# Patient Record
Sex: MALE | Race: WHITE | URBAN - METROPOLITAN AREA
[De-identification: names, ages, dates, MRNs, and addresses within clinical notes are randomized per-mention and may not be internally consistent; named-entity substitution may affect disease eponyms.]

---

## 2017-07-25 ENCOUNTER — ANESTHESIA EVENT (OUTPATIENT)
Dept: SURGERY | Facility: CLINIC | Age: 72
End: 2017-07-25
Payer: MEDICARE

## 2017-07-31 RX ORDER — ATENOLOL 50 MG/1
50 TABLET ORAL DAILY
COMMUNITY

## 2017-07-31 RX ORDER — LISINOPRIL AND HYDROCHLOROTHIAZIDE 12.5; 2 MG/1; MG/1
2 TABLET ORAL EVERY OTHER DAY
COMMUNITY

## 2017-07-31 RX ORDER — CALCIUM CARBONATE 500(1250)
1 TABLET ORAL DAILY
COMMUNITY

## 2017-08-01 ENCOUNTER — HOSPITAL ENCOUNTER (OUTPATIENT)
Dept: GENERAL RADIOLOGY | Facility: CLINIC | Age: 72
Discharge: HOME | End: 2017-08-01
Attending: PHYSICAL MEDICINE & REHABILITATION | Admitting: PHYSICAL MEDICINE & REHABILITATION
Payer: MEDICARE

## 2017-08-01 ENCOUNTER — HOSPITAL ENCOUNTER (OUTPATIENT)
Facility: CLINIC | Age: 72
Setting detail: OBSERVATION
Discharge: HOME | End: 2017-08-01
Attending: PHYSICAL MEDICINE & REHABILITATION | Admitting: PHYSICAL MEDICINE & REHABILITATION
Payer: MEDICARE

## 2017-08-01 ENCOUNTER — ANESTHESIA (OUTPATIENT)
Dept: SURGERY | Facility: CLINIC | Age: 72
End: 2017-08-01
Payer: MEDICARE

## 2017-08-01 ENCOUNTER — SURGERY (OUTPATIENT)
Age: 72
End: 2017-08-01

## 2017-08-01 VITALS
SYSTOLIC BLOOD PRESSURE: 146 MMHG | RESPIRATION RATE: 16 BRPM | BODY MASS INDEX: 26 KG/M2 | OXYGEN SATURATION: 99 % | DIASTOLIC BLOOD PRESSURE: 80 MMHG | WEIGHT: 175.56 LBS | TEMPERATURE: 98.9 F | HEIGHT: 69 IN

## 2017-08-01 DIAGNOSIS — G89.29 CHRONIC LOW BACK PAIN: ICD-10-CM

## 2017-08-01 DIAGNOSIS — M54.50 CHRONIC LOW BACK PAIN: ICD-10-CM

## 2017-08-01 PROBLEM — G89.18 POSTOPERATIVE PAIN: Status: ACTIVE | Noted: 2017-08-01

## 2017-08-01 PROCEDURE — A9270 NON-COVERED ITEM OR SERVICE: HCPCS | Mod: GY | Performed by: PHYSICAL MEDICINE & REHABILITATION

## 2017-08-01 PROCEDURE — 37000008 ZZH ANESTHESIA TECHNICAL FEE, 1ST 30 MIN: Performed by: PHYSICAL MEDICINE & REHABILITATION

## 2017-08-01 PROCEDURE — 71000027 ZZH RECOVERY PHASE 2 EACH 15 MINS: Performed by: PHYSICAL MEDICINE & REHABILITATION

## 2017-08-01 PROCEDURE — 27211024 ZZHC OR SUPPLY OTHER OPNP: Performed by: PHYSICAL MEDICINE & REHABILITATION

## 2017-08-01 PROCEDURE — 37000009 ZZH ANESTHESIA TECHNICAL FEE, EACH ADDTL 15 MIN: Performed by: PHYSICAL MEDICINE & REHABILITATION

## 2017-08-01 PROCEDURE — 25000125 ZZHC RX 250: Performed by: PHYSICAL MEDICINE & REHABILITATION

## 2017-08-01 PROCEDURE — 40000170 ZZH STATISTIC PRE-PROCEDURE ASSESSMENT II: Performed by: PHYSICAL MEDICINE & REHABILITATION

## 2017-08-01 PROCEDURE — 27810169 ZZH OR IMPLANT GENERAL: Performed by: PHYSICAL MEDICINE & REHABILITATION

## 2017-08-01 PROCEDURE — 25000132 ZZH RX MED GY IP 250 OP 250 PS 637: Mod: GY | Performed by: PHYSICAL MEDICINE & REHABILITATION

## 2017-08-01 PROCEDURE — 27210995 ZZH RX 272: Performed by: PHYSICAL MEDICINE & REHABILITATION

## 2017-08-01 PROCEDURE — 25000132 ZZH RX MED GY IP 250 OP 250 PS 637: Mod: GY | Performed by: ANESTHESIOLOGY

## 2017-08-01 PROCEDURE — C1787 PATIENT PROGR, NEUROSTIM: HCPCS | Performed by: PHYSICAL MEDICINE & REHABILITATION

## 2017-08-01 PROCEDURE — 27210794 ZZH OR GENERAL SUPPLY STERILE: Performed by: PHYSICAL MEDICINE & REHABILITATION

## 2017-08-01 PROCEDURE — A9270 NON-COVERED ITEM OR SERVICE: HCPCS | Mod: GY | Performed by: ANESTHESIOLOGY

## 2017-08-01 PROCEDURE — 25000125 ZZHC RX 250: Performed by: ANESTHESIOLOGY

## 2017-08-01 PROCEDURE — 25000128 H RX IP 250 OP 636: Performed by: ANESTHESIOLOGY

## 2017-08-01 PROCEDURE — C1778 LEAD, NEUROSTIMULATOR: HCPCS | Performed by: PHYSICAL MEDICINE & REHABILITATION

## 2017-08-01 PROCEDURE — 25000128 H RX IP 250 OP 636: Performed by: NURSE ANESTHETIST, CERTIFIED REGISTERED

## 2017-08-01 PROCEDURE — 40000277 XR SURGERY CARM FLUORO LESS THAN 5 MIN W STILLS

## 2017-08-01 PROCEDURE — 36000093 ZZH SURGERY LEVEL 4 1ST 30 MIN: Performed by: PHYSICAL MEDICINE & REHABILITATION

## 2017-08-01 PROCEDURE — C1822 GEN, NEURO, HF, RECHG BAT: HCPCS | Performed by: PHYSICAL MEDICINE & REHABILITATION

## 2017-08-01 PROCEDURE — 71000013 ZZH RECOVERY PHASE 1 LEVEL 1 EA ADDTL HR: Performed by: PHYSICAL MEDICINE & REHABILITATION

## 2017-08-01 PROCEDURE — 25000125 ZZHC RX 250: Performed by: NURSE ANESTHETIST, CERTIFIED REGISTERED

## 2017-08-01 PROCEDURE — 71000012 ZZH RECOVERY PHASE 1 LEVEL 1 FIRST HR: Performed by: PHYSICAL MEDICINE & REHABILITATION

## 2017-08-01 PROCEDURE — 36000063 ZZH SURGERY LEVEL 4 EA 15 ADDTL MIN: Performed by: PHYSICAL MEDICINE & REHABILITATION

## 2017-08-01 PROCEDURE — C1713 ANCHOR/SCREW BN/BN,TIS/BN: HCPCS | Performed by: PHYSICAL MEDICINE & REHABILITATION

## 2017-08-01 DEVICE — IMP ENVELOPE MEDT TYRX ABS ANTIBACTERIAL LG NMRM6133: Type: IMPLANTABLE DEVICE | Site: BACK | Status: FUNCTIONAL

## 2017-08-01 RX ORDER — PROCHLORPERAZINE 25 MG
12.5 SUPPOSITORY, RECTAL RECTAL EVERY 12 HOURS PRN
Status: DISCONTINUED | OUTPATIENT
Start: 2017-08-01 | End: 2017-08-01 | Stop reason: HOSPADM

## 2017-08-01 RX ORDER — SODIUM CHLORIDE, SODIUM LACTATE, POTASSIUM CHLORIDE, CALCIUM CHLORIDE 600; 310; 30; 20 MG/100ML; MG/100ML; MG/100ML; MG/100ML
INJECTION, SOLUTION INTRAVENOUS CONTINUOUS
Status: DISCONTINUED | OUTPATIENT
Start: 2017-08-01 | End: 2017-08-01 | Stop reason: HOSPADM

## 2017-08-01 RX ORDER — ONDANSETRON 2 MG/ML
4 INJECTION INTRAMUSCULAR; INTRAVENOUS EVERY 6 HOURS PRN
Status: DISCONTINUED | OUTPATIENT
Start: 2017-08-01 | End: 2017-08-01 | Stop reason: HOSPADM

## 2017-08-01 RX ORDER — CEFAZOLIN SODIUM 2 G/100ML
2 INJECTION, SOLUTION INTRAVENOUS
Status: DISCONTINUED | OUTPATIENT
Start: 2017-08-01 | End: 2017-08-01 | Stop reason: HOSPADM

## 2017-08-01 RX ORDER — AMOXICILLIN 250 MG
1-2 CAPSULE ORAL 2 TIMES DAILY
Status: DISCONTINUED | OUTPATIENT
Start: 2017-08-01 | End: 2017-08-01 | Stop reason: HOSPADM

## 2017-08-01 RX ORDER — FENTANYL CITRATE 50 UG/ML
25-50 INJECTION, SOLUTION INTRAMUSCULAR; INTRAVENOUS
Status: DISCONTINUED | OUTPATIENT
Start: 2017-08-01 | End: 2017-08-01 | Stop reason: HOSPADM

## 2017-08-01 RX ORDER — HYDROMORPHONE HYDROCHLORIDE 2 MG/1
2 TABLET ORAL ONCE
Status: COMPLETED | OUTPATIENT
Start: 2017-08-01 | End: 2017-08-01

## 2017-08-01 RX ORDER — KETAMINE HCL IN 0.9 % NACL 20 MG/2 ML
15 SYRINGE (ML) INTRAVENOUS ONCE
Status: COMPLETED | OUTPATIENT
Start: 2017-08-01 | End: 2017-08-01

## 2017-08-01 RX ORDER — METOCLOPRAMIDE 5 MG/1
5 TABLET ORAL EVERY 6 HOURS PRN
Status: DISCONTINUED | OUTPATIENT
Start: 2017-08-01 | End: 2017-08-01 | Stop reason: HOSPADM

## 2017-08-01 RX ORDER — ACETAMINOPHEN 500 MG
1000 TABLET ORAL ONCE
Status: COMPLETED | OUTPATIENT
Start: 2017-08-01 | End: 2017-08-01

## 2017-08-01 RX ORDER — ONDANSETRON 4 MG/1
4 TABLET, ORALLY DISINTEGRATING ORAL EVERY 30 MIN PRN
Status: DISCONTINUED | OUTPATIENT
Start: 2017-08-01 | End: 2017-08-01 | Stop reason: HOSPADM

## 2017-08-01 RX ORDER — PROPOFOL 10 MG/ML
INJECTION, EMULSION INTRAVENOUS CONTINUOUS PRN
Status: DISCONTINUED | OUTPATIENT
Start: 2017-08-01 | End: 2017-08-01

## 2017-08-01 RX ORDER — MEPERIDINE HYDROCHLORIDE 25 MG/ML
12.5 INJECTION INTRAMUSCULAR; INTRAVENOUS; SUBCUTANEOUS
Status: DISCONTINUED | OUTPATIENT
Start: 2017-08-01 | End: 2017-08-01 | Stop reason: HOSPADM

## 2017-08-01 RX ORDER — HYDROMORPHONE HYDROCHLORIDE 1 MG/ML
.3-.5 INJECTION, SOLUTION INTRAMUSCULAR; INTRAVENOUS; SUBCUTANEOUS EVERY 10 MIN PRN
Status: DISCONTINUED | OUTPATIENT
Start: 2017-08-01 | End: 2017-08-01 | Stop reason: HOSPADM

## 2017-08-01 RX ORDER — ONDANSETRON 2 MG/ML
4 INJECTION INTRAMUSCULAR; INTRAVENOUS EVERY 30 MIN PRN
Status: DISCONTINUED | OUTPATIENT
Start: 2017-08-01 | End: 2017-08-01 | Stop reason: HOSPADM

## 2017-08-01 RX ORDER — LIDOCAINE HYDROCHLORIDE AND EPINEPHRINE 10; 10 MG/ML; UG/ML
INJECTION, SOLUTION INFILTRATION; PERINEURAL PRN
Status: DISCONTINUED | OUTPATIENT
Start: 2017-08-01 | End: 2017-08-01 | Stop reason: HOSPADM

## 2017-08-01 RX ORDER — LIDOCAINE 40 MG/G
CREAM TOPICAL
Status: DISCONTINUED | OUTPATIENT
Start: 2017-08-01 | End: 2017-08-01 | Stop reason: HOSPADM

## 2017-08-01 RX ORDER — PROCHLORPERAZINE MALEATE 5 MG
5 TABLET ORAL EVERY 6 HOURS PRN
Status: DISCONTINUED | OUTPATIENT
Start: 2017-08-01 | End: 2017-08-01 | Stop reason: HOSPADM

## 2017-08-01 RX ORDER — LIDOCAINE HYDROCHLORIDE 20 MG/ML
INJECTION, SOLUTION INFILTRATION; PERINEURAL PRN
Status: DISCONTINUED | OUTPATIENT
Start: 2017-08-01 | End: 2017-08-01

## 2017-08-01 RX ORDER — MAGNESIUM HYDROXIDE 1200 MG/15ML
LIQUID ORAL PRN
Status: DISCONTINUED | OUTPATIENT
Start: 2017-08-01 | End: 2017-08-01 | Stop reason: HOSPADM

## 2017-08-01 RX ORDER — FENTANYL CITRATE 50 UG/ML
INJECTION, SOLUTION INTRAMUSCULAR; INTRAVENOUS PRN
Status: DISCONTINUED | OUTPATIENT
Start: 2017-08-01 | End: 2017-08-01

## 2017-08-01 RX ORDER — IBUPROFEN 600 MG/1
600 TABLET, FILM COATED ORAL EVERY 6 HOURS PRN
Status: DISCONTINUED | OUTPATIENT
Start: 2017-08-01 | End: 2017-08-01 | Stop reason: HOSPADM

## 2017-08-01 RX ORDER — PROPOFOL 10 MG/ML
INJECTION, EMULSION INTRAVENOUS PRN
Status: DISCONTINUED | OUTPATIENT
Start: 2017-08-01 | End: 2017-08-01

## 2017-08-01 RX ORDER — ONDANSETRON 4 MG/1
4 TABLET, ORALLY DISINTEGRATING ORAL EVERY 6 HOURS PRN
Status: DISCONTINUED | OUTPATIENT
Start: 2017-08-01 | End: 2017-08-01 | Stop reason: HOSPADM

## 2017-08-01 RX ORDER — LABETALOL HYDROCHLORIDE 5 MG/ML
10 INJECTION, SOLUTION INTRAVENOUS
Status: DISCONTINUED | OUTPATIENT
Start: 2017-08-01 | End: 2017-08-01 | Stop reason: HOSPADM

## 2017-08-01 RX ORDER — SODIUM CHLORIDE, SODIUM LACTATE, POTASSIUM CHLORIDE, CALCIUM CHLORIDE 600; 310; 30; 20 MG/100ML; MG/100ML; MG/100ML; MG/100ML
INJECTION, SOLUTION INTRAVENOUS CONTINUOUS PRN
Status: DISCONTINUED | OUTPATIENT
Start: 2017-08-01 | End: 2017-08-01

## 2017-08-01 RX ORDER — ONDANSETRON 2 MG/ML
INJECTION INTRAMUSCULAR; INTRAVENOUS PRN
Status: DISCONTINUED | OUTPATIENT
Start: 2017-08-01 | End: 2017-08-01

## 2017-08-01 RX ORDER — ALBUTEROL SULFATE 0.83 MG/ML
2.5 SOLUTION RESPIRATORY (INHALATION) EVERY 4 HOURS PRN
Status: DISCONTINUED | OUTPATIENT
Start: 2017-08-01 | End: 2017-08-01 | Stop reason: HOSPADM

## 2017-08-01 RX ORDER — HYDRALAZINE HYDROCHLORIDE 20 MG/ML
2.5-5 INJECTION INTRAMUSCULAR; INTRAVENOUS EVERY 10 MIN PRN
Status: DISCONTINUED | OUTPATIENT
Start: 2017-08-01 | End: 2017-08-01 | Stop reason: HOSPADM

## 2017-08-01 RX ORDER — METOCLOPRAMIDE HYDROCHLORIDE 5 MG/ML
5 INJECTION INTRAMUSCULAR; INTRAVENOUS EVERY 6 HOURS PRN
Status: DISCONTINUED | OUTPATIENT
Start: 2017-08-01 | End: 2017-08-01 | Stop reason: HOSPADM

## 2017-08-01 RX ORDER — ACETAMINOPHEN 325 MG/1
650 TABLET ORAL EVERY 4 HOURS PRN
Status: DISCONTINUED | OUTPATIENT
Start: 2017-08-01 | End: 2017-08-01 | Stop reason: HOSPADM

## 2017-08-01 RX ORDER — CEFAZOLIN SODIUM 1 G/3ML
1 INJECTION, POWDER, FOR SOLUTION INTRAMUSCULAR; INTRAVENOUS SEE ADMIN INSTRUCTIONS
Status: DISCONTINUED | OUTPATIENT
Start: 2017-08-01 | End: 2017-08-01 | Stop reason: HOSPADM

## 2017-08-01 RX ORDER — GABAPENTIN 300 MG/1
300 CAPSULE ORAL ONCE
Status: COMPLETED | OUTPATIENT
Start: 2017-08-01 | End: 2017-08-01

## 2017-08-01 RX ORDER — NALOXONE HYDROCHLORIDE 0.4 MG/ML
.1-.4 INJECTION, SOLUTION INTRAMUSCULAR; INTRAVENOUS; SUBCUTANEOUS
Status: DISCONTINUED | OUTPATIENT
Start: 2017-08-01 | End: 2017-08-01 | Stop reason: HOSPADM

## 2017-08-01 RX ORDER — HYDROCODONE BITARTRATE AND ACETAMINOPHEN 5; 325 MG/1; MG/1
1-2 TABLET ORAL EVERY 4 HOURS PRN
Status: DISCONTINUED | OUTPATIENT
Start: 2017-08-01 | End: 2017-08-01 | Stop reason: HOSPADM

## 2017-08-01 RX ORDER — HYDROMORPHONE HYDROCHLORIDE 1 MG/ML
0.2 INJECTION, SOLUTION INTRAMUSCULAR; INTRAVENOUS; SUBCUTANEOUS
Status: DISCONTINUED | OUTPATIENT
Start: 2017-08-01 | End: 2017-08-01 | Stop reason: HOSPADM

## 2017-08-01 RX ADMIN — FENTANYL CITRATE 25 MCG: 50 INJECTION, SOLUTION INTRAMUSCULAR; INTRAVENOUS at 10:45

## 2017-08-01 RX ADMIN — HYDROMORPHONE HYDROCHLORIDE 0.5 MG: 1 INJECTION, SOLUTION INTRAMUSCULAR; INTRAVENOUS; SUBCUTANEOUS at 12:44

## 2017-08-01 RX ADMIN — DEXMEDETOMIDINE HYDROCHLORIDE 4 MCG: 100 INJECTION, SOLUTION INTRAVENOUS at 11:40

## 2017-08-01 RX ADMIN — ACETAMINOPHEN 975 MG: 325 TABLET, FILM COATED ORAL at 14:45

## 2017-08-01 RX ADMIN — DEXMEDETOMIDINE HYDROCHLORIDE 4 MCG: 100 INJECTION, SOLUTION INTRAVENOUS at 10:14

## 2017-08-01 RX ADMIN — DEXMEDETOMIDINE HYDROCHLORIDE 4 MCG: 100 INJECTION, SOLUTION INTRAVENOUS at 10:40

## 2017-08-01 RX ADMIN — SODIUM CHLORIDE, POTASSIUM CHLORIDE, SODIUM LACTATE AND CALCIUM CHLORIDE: 600; 310; 30; 20 INJECTION, SOLUTION INTRAVENOUS at 10:13

## 2017-08-01 RX ADMIN — DEXMEDETOMIDINE HYDROCHLORIDE 4 MCG: 100 INJECTION, SOLUTION INTRAVENOUS at 10:17

## 2017-08-01 RX ADMIN — GABAPENTIN 300 MG: 300 CAPSULE ORAL at 14:00

## 2017-08-01 RX ADMIN — PROPOFOL 20 MG: 10 INJECTION, EMULSION INTRAVENOUS at 11:49

## 2017-08-01 RX ADMIN — HYDROMORPHONE HYDROCHLORIDE 0.5 MG: 1 INJECTION, SOLUTION INTRAMUSCULAR; INTRAVENOUS; SUBCUTANEOUS at 13:04

## 2017-08-01 RX ADMIN — ONDANSETRON 4 MG: 2 INJECTION INTRAMUSCULAR; INTRAVENOUS at 10:28

## 2017-08-01 RX ADMIN — DEXMEDETOMIDINE HYDROCHLORIDE 4 MCG: 100 INJECTION, SOLUTION INTRAVENOUS at 10:49

## 2017-08-01 RX ADMIN — DEXMEDETOMIDINE HYDROCHLORIDE 4 MCG: 100 INJECTION, SOLUTION INTRAVENOUS at 10:53

## 2017-08-01 RX ADMIN — LIDOCAINE HYDROCHLORIDE 40 MG: 20 INJECTION, SOLUTION INFILTRATION; PERINEURAL at 10:17

## 2017-08-01 RX ADMIN — FENTANYL CITRATE 25 MCG: 50 INJECTION, SOLUTION INTRAMUSCULAR; INTRAVENOUS at 10:17

## 2017-08-01 RX ADMIN — HYDROMORPHONE HYDROCHLORIDE 0.5 MG: 1 INJECTION, SOLUTION INTRAMUSCULAR; INTRAVENOUS; SUBCUTANEOUS at 14:48

## 2017-08-01 RX ADMIN — MIDAZOLAM HYDROCHLORIDE 0.5 MG: 1 INJECTION, SOLUTION INTRAMUSCULAR; INTRAVENOUS at 10:26

## 2017-08-01 RX ADMIN — Medication 15 MG: at 14:03

## 2017-08-01 RX ADMIN — LIDOCAINE HYDROCHLORIDE 20 MG: 20 INJECTION, SOLUTION INFILTRATION; PERINEURAL at 10:26

## 2017-08-01 RX ADMIN — DEXMEDETOMIDINE HYDROCHLORIDE 4 MCG: 100 INJECTION, SOLUTION INTRAVENOUS at 10:43

## 2017-08-01 RX ADMIN — DEXMEDETOMIDINE HYDROCHLORIDE 4 MCG: 100 INJECTION, SOLUTION INTRAVENOUS at 10:30

## 2017-08-01 RX ADMIN — FENTANYL CITRATE 25 MCG: 50 INJECTION, SOLUTION INTRAMUSCULAR; INTRAVENOUS at 10:30

## 2017-08-01 RX ADMIN — LIDOCAINE HYDROCHLORIDE AND EPINEPHRINE 55 ML: 10; 10 INJECTION, SOLUTION INFILTRATION; PERINEURAL at 12:02

## 2017-08-01 RX ADMIN — FENTANYL CITRATE 25 MCG: 50 INJECTION, SOLUTION INTRAMUSCULAR; INTRAVENOUS at 11:41

## 2017-08-01 RX ADMIN — SODIUM CHLORIDE 1000 ML: 0.9 IRRIGANT IRRIGATION at 10:33

## 2017-08-01 RX ADMIN — HYDROMORPHONE HYDROCHLORIDE 2 MG: 2 TABLET ORAL at 15:48

## 2017-08-01 RX ADMIN — DEXMEDETOMIDINE HYDROCHLORIDE 4 MCG: 100 INJECTION, SOLUTION INTRAVENOUS at 11:43

## 2017-08-01 RX ADMIN — PROPOFOL 75 MCG/KG/MIN: 10 INJECTION, EMULSION INTRAVENOUS at 10:20

## 2017-08-01 RX ADMIN — SODIUM CHLORIDE, POTASSIUM CHLORIDE, SODIUM LACTATE AND CALCIUM CHLORIDE: 600; 310; 30; 20 INJECTION, SOLUTION INTRAVENOUS at 11:58

## 2017-08-01 ASSESSMENT — LIFESTYLE VARIABLES: TOBACCO_USE: 0

## 2017-08-01 ASSESSMENT — ENCOUNTER SYMPTOMS
DYSRHYTHMIAS: 0
SEIZURES: 0

## 2017-08-01 NOTE — IP AVS SNAPSHOT
MRN:[id number]                      After Visit Summary   8/1/2017    Chucho Pressley    MRN: [id number]           Thank you!     Thank you for choosing [facility] for your care. Our goal is always to provide you with excellent care. Hearing back from our patients is one way we can continue to improve our services. Please take a few minutes to complete the written survey that you may receive in the mail after you visit with us. Thank you!        Patient Information     Date Of Birth          1945        About your hospital stay     You were admitted on:  August 1, 2017 You last received care in the:  [facility] PreOP/Phase II    You were discharged on:  August 1, 2017       Who to Call     For medical emergencies, please call 911.  For non-urgent questions about your medical care, please call your primary care provider or clinic, [phone]  For questions related to your surgery, please call your surgery clinic        Attending Provider     Provider Lnea Odell, DO Physical Medicine and Rehab       Primary Care Provider Office Phone # Fax #    [facility] [phone]      Further instructions from your care team       Same Day Surgery Discharge Instructions for  Sedation and General Anesthesia       It's not unusual to feel dizzy, light-headed or faint for up to 24 hours after surgery or while taking pain medication.  If you have these symptoms: sit for a few minutes before standing and have someone assist you when you get up to walk or use the bathroom.      You should rest and relax for the next 24 hours. We recommend you make arrangements to have an adult stay with you for at least 24 hours after your discharge.  Avoid hazardous and strenuous activity.      DO NOT DRIVE any vehicle or operate mechanical equipment for 24 hours following the end of your surgery.  Even though you may feel normal, your reactions may be affected by the  medication you have received.      Do not drink alcoholic beverages for 24 hours following surgery.       Slowly progress to your regular diet as you feel able. It's not unusual to feel nauseated and/or vomit after receiving anesthesia.  If you develop these symptoms, drink clear liquids (apple juice, ginger ale, broth, 7-up, etc. ) until you feel better.  If your nausea and vomiting persists for 24 hours, please notify your surgeon.        All narcotic pain medications, along with inactivity and anesthesia, can cause constipation. Drinking plenty of liquids and increasing fiber intake will help.      For any questions of a medical nature, call your surgeon.      Do not make important decisions for 24 hours.      If you had general anesthesia, you may have a sore throat for a couple of days related to the breathing tube used during surgery.  You may use Cepacol lozenges to help with this discomfort.  If it worsens or if you develop a fever, contact your surgeon.       If you feel your pain is not well managed with the pain medications prescribed by your surgeon, please contact your surgeon's office to let them know so they can address your concerns.     NEUROSTIMULATOR (SPINAL CORD STIMULATOR) HANDOUT  [facility]    THE GOALS AND EXPECTATIONS OF THE NEUROSTIMULATOR  1. You will need to refrain from having any dental work or colonoscopy for 2 weeks prior and 2 weeks after this procedure.  2. Certain things such as an MRI, ultrasound, diathermy, chiropractic manipulation, radiation or electrocautery may damage your neurostimulator and you should avoid these things or get MD approval first.  Cardiac pacemakers and defibrillators may interfere with the neurostimulator.  3. Normal household equipment such as cell phones, microwaves, TV s, computers, electric blankets and heating pads will not damage the neurostimulator.  4. There are activity restrictions during the recovery period after the trial implant as  well as after the permanent implant.    ACTIVITY RESTRICTIONS  The following restrictions will be in place during your trial while the leads are in place and for 4-6 weeks following lead placement of the permanent neurostimulator:  1. No raising hands above the head.  2. No twisting, bending or stretching body at the waist.  3. No lifting items greater than 5 pounds.  4. No sitting for prolonged periods of time 2 hours maximum.  (Some things that may help you comply with these restrictions are: slip on shoes, a step stool for the kitchen or bathroom and a  reacher  to grasp objects *these can be purchased at most pharmacies.)    *Following these restrictions will help ensure your leads do not move from the epidural space where  Will placed them.  Once the permanent device is implanted, scar tissue will build up which will help ensure the leads do not migrate.    SITUATIONS IN WHICH YOU CANNOT USE THE STIMULATOR  1. Driving an automobile, motorcycle, boat, riding  or anything motorized.  2. Operating any type of machinery or equipment such as: chain saw, electric nailer or wood cutting tools.    *Why?  Any quick movement or position change can cause extra stimulation to the nerves resulting in you losing your ability to control an automobile or tool.  You will eventually learn which positions cause this extra stimulation, but it is best for you to use the magnet to simply turn off the stimulator while doing these tasks in order to avoid accidents.    RISKS  1. Any complication following surgery or anesthesia is possible, including but not limited to bleeding, infection, and death.  2. Infection in the epidural space could potentially lead to meningitis or an epidural abscess.  This would result in removal of the spinal cord stimulator.  3. Accumulation of fluid in the power source site (seroma).  4. Spinal headache which sometimes requires a blood patch.  5. Mechanical complications with the system  including dislodgement of the lead/electrode, breaks in the wiring or problems with the power source.  6. Bleeding into the epidural space could result in a hematoma and nerve damage.  This may require surgical removal of the spinal cord stimulator.    DISCHARGE INSTRUCTIONS  1. Temporary surgical pain may require pain management with the use of opioids (narcotic pain medication prescribed by your provider).  Opioids can cause constipation.  Make sure you are eating plenty of fibrous foods and drinking plenty of water.  For a while, you may need to use an over-the-counter laxative such as Senokot or Milk of Magnesia.  2. You may not shower at all while your trial catheter is in.  You may shower 24 hours after your trial leads are pulled.  You may not shower for 5 days after the permanent implant surgery. Remove your dressings before showering on the 5th day.  3. You may bathe 7 days after your trial leads are pulled.  You may bathe 3 weeks after your permanent implant surgery.  4. You should start your oral antibiotics the day after your trial and/or permanent implantation and continue taking them for 10 days.  5. Sometimes a leakage of cerebral spinal fluid around the lead site may cause a spinal headache.  To alleviate this headache try lying flat as much as possible and drinking plenty of non-carbonated caffeinated fluids.  6. Stimulator reprogramming may be required periodically for the next few months.    SYMPTOMS TO REPORT  1. Signs of infection such as chills, fever, increased pain, redness, drainage or swelling from the incision site.  2. Headache persisting beyond 48 hours.  3. Unusual changes in sensation or loss of sensation.  4. Painful sensations from the neurostimulator: PLEASE STOP THE STIMULATOR and call your doctor   5. It is considered a medical emergency if you experience:  a. Sudden severe back pain  b. Sudden onset of leg weakness or severe spasms  c. Loss of bladder control and / or bowel  "function    While you were at the hospital today you were given 975 mg of Tylenol at 2:45pm. The recommended daily maximum dose is 4000 mg.     You received Dilaudid 2mg tab at 3:45pm.    **If you have questions or concerns about your procedure,   call Dr. Saunders at [phone]**        Pending Results     No orders found from 2017 to 2017.            Admission Information     Date & Time Provider Department Dept. Phone    2017 Lena Saunders,  [facility] PreOP/Phase [phone]      Your Vitals Were     Blood Pressure Temperature Respirations Height Weight Pulse Oximetry    146/80 98.9  F (37.2  C) (Temporal) 16 1.753 m (5' 9\") 79.6 kg (175 lb 9 oz) 99%    BMI (Body Mass Index)                   25.93 kg/m2           [facility] Information     [employer] lets you send messages to your doctor, view your test results, renew your prescriptions, schedule appointments and more. To sign up, go to [email or web address] . Click on \"Log in\" on the left side of the screen, which will take you to the Welcome page. Then click on \"Sign up Now\" on the right side of the page.     You will be asked to enter the access code listed below, as well as some personal information. Please follow the directions to create your username and password.     Your access code is: [email or web address]  Expires: 10/30/2017  2:36 PM     Your access code will  in 90 days. If you need help or a new code, please call your [facility] clinic or [phone].        Care EveryWhere ID     This is your Care EveryWhere ID. This could be used by other organizations to access your [facility] medical records  [id number]        Equal Access to Services     [place] LOVE : Afshin Pierce, aiden caldwell, qaybta kaalguzman acosta. So [facility] [phone].    ATENCIÓN: Si habla español, tiene a chavez disposición servicios gratuitos de asistencia lingüística. Llvelasquez al [phone].    We " comply with applicable federal civil rights laws and [place] laws. We do not discriminate on the basis of race, color, national origin, age, disability sex, sexual orientation or gender identity.               Review of your medicines      UNREVIEWED medicines. Ask your doctor about these medicines        Dose / Directions    ascorbic acid 500 MG Cpcr CR capsule   Commonly known as:  vitamin C        Dose:  500 mg   Take 500 mg by mouth daily   Refills:  0       aspirin 81 MG tablet        Take by mouth daily   Refills:  0       atenolol 50 MG tablet   Commonly known as:  TENORMIN        Dose:  50 mg   Take 50 mg by mouth daily   Refills:  0       calcium carbonate 1250 MG tablet   Commonly known as:  OS-HUSAM 500 mg [place]. [place]        Dose:  1 tablet   Take 1 tablet by mouth daily   Refills:  0       GEMFIBROZIL PO        Dose:  600 mg   Take 600 mg by mouth 2 times daily (before meals)   Refills:  0       GLUCOSAMINE CHONDR 1500 COMPLX PO        Dose:  1 tablet   Take 1 tablet by mouth daily   Refills:  0       lisinopril-hydrochlorothiazide 20-12.5 MG per tablet   Commonly known as:  PRINZIDE/ZESTORETIC        Dose:  2 tablet   Take 2 tablets by mouth every other day   Refills:  0       M-VIT PO        Dose:  1 tablet   Take 1 tablet by mouth daily   Refills:  0       OMEGA-3 FATTY ACIDS PO        Dose:  1 tablet   Take 1 tablet by mouth daily   Refills:  0       REVATIO PO        Dose:  20-40 mg   Take 20-40 mg by mouth as needed   Refills:  0                Protect others around you: Learn how to safely use, store and throw away your medicines at www.disposemymeds.org.             Medication List: This is a list of all your medications and when to take them. Check marks below indicate your daily home schedule. Keep this list as a reference.      Medications           Morning Afternoon Evening Bedtime As Needed    ascorbic acid 500 MG Cpcr CR capsule   Commonly known as:  vitamin C   Take 500 mg by mouth daily                                 aspirin 81 MG tablet   Take by mouth daily                                atenolol 50 MG tablet   Commonly known as:  TENORMIN   Take 50 mg by mouth daily                                calcium carbonate 1250 MG tablet   Commonly known as:  OS-HUSAM 500 mg [place]. [place]   Take 1 tablet by mouth daily                                GEMFIBROZIL PO   Take 600 mg by mouth 2 times daily (before meals)                                GLUCOSAMINE CHONDR 1500 COMPLX PO   Take 1 tablet by mouth daily                                lisinopril-hydrochlorothiazide 20-12.5 MG per tablet   Commonly known as:  PRINZIDE/ZESTORETIC   Take 2 tablets by mouth every other day                                M-VIT PO   Take 1 tablet by mouth daily                                OMEGA-3 FATTY ACIDS PO   Take 1 tablet by mouth daily                                REVATIO PO   Take 20-40 mg by mouth as needed

## 2017-08-01 NOTE — DISCHARGE INSTRUCTIONS
Same Day Surgery Discharge Instructions for  Sedation and General Anesthesia       It's not unusual to feel dizzy, light-headed or faint for up to 24 hours after surgery or while taking pain medication.  If you have these symptoms: sit for a few minutes before standing and have someone assist you when you get up to walk or use the bathroom.      You should rest and relax for the next 24 hours. We recommend you make arrangements to have an adult stay with you for at least 24 hours after your discharge.  Avoid hazardous and strenuous activity.      DO NOT DRIVE any vehicle or operate mechanical equipment for 24 hours following the end of your surgery.  Even though you may feel normal, your reactions may be affected by the medication you have received.      Do not drink alcoholic beverages for 24 hours following surgery.       Slowly progress to your regular diet as you feel able. It's not unusual to feel nauseated and/or vomit after receiving anesthesia.  If you develop these symptoms, drink clear liquids (apple juice, ginger ale, broth, 7-up, etc. ) until you feel better.  If your nausea and vomiting persists for 24 hours, please notify your surgeon.        All narcotic pain medications, along with inactivity and anesthesia, can cause constipation. Drinking plenty of liquids and increasing fiber intake will help.      For any questions of a medical nature, call your surgeon.      Do not make important decisions for 24 hours.      If you had general anesthesia, you may have a sore throat for a couple of days related to the breathing tube used during surgery.  You may use Cepacol lozenges to help with this discomfort.  If it worsens or if you develop a fever, contact your surgeon.       If you feel your pain is not well managed with the pain medications prescribed by your surgeon, please contact your surgeon's office to let them know so they can address your concerns.     NEUROSTIMULATOR (SPINAL CORD STIMULATOR)  HANDOUT  [facility]    THE GOALS AND EXPECTATIONS OF THE NEUROSTIMULATOR  1. You will need to refrain from having any dental work or colonoscopy for 2 weeks prior and 2 weeks after this procedure.  2. Certain things such as an MRI, ultrasound, diathermy, chiropractic manipulation, radiation or electrocautery may damage your neurostimulator and you should avoid these things or get MD approval first.  Cardiac pacemakers and defibrillators may interfere with the neurostimulator.  3. Normal household equipment such as cell phones, microwaves, TV s, computers, electric blankets and heating pads will not damage the neurostimulator.  4. There are activity restrictions during the recovery period after the trial implant as well as after the permanent implant.    ACTIVITY RESTRICTIONS  The following restrictions will be in place during your trial while the leads are in place and for 4-6 weeks following lead placement of the permanent neurostimulator:  1. No raising hands above the head.  2. No twisting, bending or stretching body at the waist.  3. No lifting items greater than 5 pounds.  4. No sitting for prolonged periods of time 2 hours maximum.  (Some things that may help you comply with these restrictions are: slip on shoes, a step stool for the kitchen or bathroom and a  reacher  to grasp objects *these can be purchased at most pharmacies.)    *Following these restrictions will help ensure your leads do not move from the epidural space where  Will placed them.  Once the permanent device is implanted, scar tissue will build up which will help ensure the leads do not migrate.    SITUATIONS IN WHICH YOU CANNOT USE THE STIMULATOR  1. Driving an automobile, motorcycle, boat, riding  or anything motorized.  2. Operating any type of machinery or equipment such as: chain saw, electric nailer or wood cutting tools.    *Why?  Any quick movement or position change can cause extra stimulation to the nerves  resulting in you losing your ability to control an automobile or tool.  You will eventually learn which positions cause this extra stimulation, but it is best for you to use the magnet to simply turn off the stimulator while doing these tasks in order to avoid accidents.    RISKS  1. Any complication following surgery or anesthesia is possible, including but not limited to bleeding, infection, and death.  2. Infection in the epidural space could potentially lead to meningitis or an epidural abscess.  This would result in removal of the spinal cord stimulator.  3. Accumulation of fluid in the power source site (seroma).  4. Spinal headache which sometimes requires a blood patch.  5. Mechanical complications with the system including dislodgement of the lead/electrode, breaks in the wiring or problems with the power source.  6. Bleeding into the epidural space could result in a hematoma and nerve damage.  This may require surgical removal of the spinal cord stimulator.    DISCHARGE INSTRUCTIONS  1. Temporary surgical pain may require pain management with the use of opioids (narcotic pain medication prescribed by your provider).  Opioids can cause constipation.  Make sure you are eating plenty of fibrous foods and drinking plenty of water.  For a while, you may need to use an over-the-counter laxative such as Senokot or Milk of Magnesia.  2. You may not shower at all while your trial catheter is in.  You may shower 24 hours after your trial leads are pulled.  You may not shower for 5 days after the permanent implant surgery. Remove your dressings before showering on the 5th day.  3. You may bathe 7 days after your trial leads are pulled.  You may bathe 3 weeks after your permanent implant surgery.  4. You should start your oral antibiotics the day after your trial and/or permanent implantation and continue taking them for 10 days.  5. Sometimes a leakage of cerebral spinal fluid around the lead site may cause a spinal  headache.  To alleviate this headache try lying flat as much as possible and drinking plenty of non-carbonated caffeinated fluids.  6. Stimulator reprogramming may be required periodically for the next few months.    SYMPTOMS TO REPORT  1. Signs of infection such as chills, fever, increased pain, redness, drainage or swelling from the incision site.  2. Headache persisting beyond 48 hours.  3. Unusual changes in sensation or loss of sensation.  4. Painful sensations from the neurostimulator: PLEASE STOP THE STIMULATOR and call your doctor   5. It is considered a medical emergency if you experience:  a. Sudden severe back pain  b. Sudden onset of leg weakness or severe spasms  c. Loss of bladder control and / or bowel function    While you were at the hospital today you were given 975 mg of Tylenol at 2:45pm. The recommended daily maximum dose is 4000 mg.     You received Dilaudid 2mg tab at 3:45pm.    **If you have questions or concerns about your procedure,  call Dr. León at [phone]**

## 2017-08-01 NOTE — ANESTHESIA PREPROCEDURE EVALUATION
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 Authorizing Provider   ascorbic acid (VITAMIN C) 500 MG CPCR CR capsule Take 500 mg by mouth daily   Yes Reported, Patient   aspirin 81 MG tablet Take by mouth daily   Yes Reported, Patient   atenolol (TENORMIN) 50 MG tablet Take 50 mg by mouth daily   Yes Reported, Patient   calcium carbonate (OS-HUSAM 500 MG [place]. [place]) 1250 MG tablet Take 1 tablet by mouth daily   Yes Reported, Patient   GEMFIBROZIL PO Take 600 mg by mouth 2 times daily (before meals)   Yes Reported, Patient   Glucosamine-Chondroit-Vit C-Mn (GLUCOSAMINE CHONDR 1500 COMPLX PO) Take 1 tablet by mouth daily   Yes Reported, Patient   lisinopril-hydrochlorothiazide (PRINZIDE/ZESTORETIC) 20-12.5 MG per tablet Take 2 tablets by mouth every other day   Yes Reported, Patient   Prenatal Vit-Fe Fumarate-FA (M-VIT PO) Take 1 tablet by mouth daily   Yes Reported, Patient   OMEGA-3 FATTY ACIDS PO Take 1 tablet by mouth daily   Yes Reported, Patient   Sildenafil Citrate (REVATIO PO) Take 20-40 mg by mouth as needed   Yes Reported, Patient     No current Epic-ordered facility-administered medications on file.      Current Outpatient Prescriptions Ordered in Epic   Medication     ascorbic acid (VITAMIN C) 500 MG CPCR CR capsule     aspirin 81 MG tablet     atenolol (TENORMIN) 50 MG tablet     calcium carbonate (OS-HUSAM 500 MG [place]. [place]) 1250 MG tablet     GEMFIBROZIL PO      Glucosamine-Chondroit-Vit C-Mn (GLUCOSAMINE CHONDR 1500 COMPLX PO)     lisinopril-hydrochlorothiazide (PRINZIDE/ZESTORETIC) 20-12.5 MG per tablet     Prenatal Vit-Fe Fumarate-FA (M-VIT PO)     OMEGA-3 FATTY ACIDS PO     Sildenafil Citrate (REVATIO PO)     Wt Readings from Last 1 Encounters:   No data found for Wt     Temp Readings from Last 1 Encounters:   No data found for Temp     BP Readings from Last 6 Encounters:   No data found for BP     Pulse Readings from Last 4 Encounters:   No data found for Pulse     Resp Readings from Last 1 Encounters:   No data found for Resp     SpO2 Readings from Last 1 Encounters:   No data found for SpO2       RECENT LABS: cr 1.31, k 4.7, hgb 12.7, plt 207  ECG: sinus nguyen, no st or twave abnormalities.       Anesthesia Evaluation     .             ROS/MED HX    ENT/Pulmonary:     (+)TITA risk factors snores loudly, hypertension, , . .   (-) tobacco use, asthma and sleep apnea   Neurologic:     (+)other neuro chronic low back pain   (-) seizures, CVA and migraines   Cardiovascular:     (+) Dyslipidemia, hypertension----. : . . . :. .      (-) CAD, arrhythmias and valvular problems/murmurs   METS/Exercise Tolerance:     Hematologic:        (-) history of blood clots, anemia and other hematologic disorder   Musculoskeletal:        (-) arthritis   GI/Hepatic:     (+) GERD      (-) liver disease   Renal/Genitourinary:      (-) renal disease and nephrolithiasis   Endo:      (-) Type I DM, Type II DM and thyroid disease   Psychiatric:         Infectious Disease:        (-) Recent Fever   Malignancy:         Other:    (+) H/O Chronic Pain,H/O chronic opiod use ,                    Physical Exam  Normal systems: cardiovascular, pulmonary and dental    Airway   Mallampati: II  TM distance: >3 FB  Neck ROM: limited    Dental     Cardiovascular   Rhythm and rate: regular and normal  (-) no murmur    Pulmonary    breath sounds clear to auscultation                    Anesthesia  Plan      History & Physical Review      ASA Status:  3 .    NPO Status:  > 8 hours    Plan for MAC Reason for MAC:  Deep or markedly invasive procedure (G8)  PONV prophylaxis:  Ondansetron (or other 5HT-3) and Dexamethasone or Solumedrol  Precedex, fentanyl as needed   Propofol infusion      Postoperative Care  Postoperative pain management:  Multi-modal analgesia.      Consents  Anesthetic plan, risks, benefits and alternatives discussed with:  Patient..                          .

## 2017-08-01 NOTE — ANESTHESIA POSTPROCEDURE EVALUATION
Patient: Chucho Pressley    Procedure(s):  SPINAL CORD STIMULATOR IMPLANT WITH NEVRO    Diagnosis:OTHER INTRAVERTIBIAL DISC GENERATION LUMBAR REGION  Diagnosis Additional Information: No value filed.    Anesthesia Type:  MAC    Note:  Anesthesia Post Evaluation    Patient location during evaluation: PACU  Patient participation: Able to fully participate in evaluation  Level of consciousness: awake and alert  Pain management: satisfactory to patient  Airway patency: patent  Cardiovascular status: acceptable, blood pressure returned to baseline and hemodynamically stable  Respiratory status: acceptable  Hydration status: acceptable  PONV: none     Anesthetic complications: None    Comments: Ketamine, gabapentin, oral and IV hydromorphone in PACU, patient still reports pain but much improved.         Last vitals:  Vitals:    08/01/17 1415 08/01/17 1430 08/01/17 1445   BP: 135/74 150/80 140/76   Resp: 19 16 21   Temp:      SpO2: 98% 100% 95%         Electronically Signed By: Melissa Johnson MD  August 1, 2017  3:30 PM

## 2017-08-01 NOTE — ANESTHESIA CARE TRANSFER NOTE
Patient: Chucho Pressley    Procedure(s):  SPINAL CORD STIMULATOR IMPLANT WITH NEVRO    Diagnosis: OTHER INTRAVERTIBIAL DISC GENERATION LUMBAR REGION  Diagnosis Additional Information: No value filed.    Anesthesia Type:   MAC     Note:  Airway :Nasal Cannula  Patient transferred to:PACU  Comments: O2 3c lpm nasal canula, sats are 100%. Spontaneous respirations. Moves all extremities equally. Report to RORY Gill.      Vitals: (Last set prior to Anesthesia Care Transfer)    CRNA VITALS  8/1/2017 1157 - 8/1/2017 1232      8/1/2017             Resp Rate (set): 10                Electronically Signed By: ZANDRA Snowden CRNA  August 1, 2017  12:32 PM

## 2017-08-01 NOTE — IP AVS SNAPSHOT
[facility] PreOP/Phase II    [address]    [place] [address]    Phone:  [phone]                                       After Visit Summary   8/1/2017    Chucho Pressley    MRN: [id number]           After Visit Summary Signature Page     I have received my discharge instructions, and my questions have been answered. I have discussed any challenges I see with this plan with the nurse or doctor.    ..........................................................................................................................................  Patient/Patient Representative Signature      ..........................................................................................................................................  Patient Representative Print Name and Relationship to Patient    ..................................................               ................................................  Date                                            Time    ..........................................................................................................................................  Reviewed by Signature/Title    ...................................................              ..............................................  Date                                                            Time

## 2017-08-03 NOTE — PROCEDURES
Name:   Chucho Pressley   :   1945  Procedure date: 2017  Surgeon:   Lena Saunders DO  Procedure:    Nevro Spinal Cord Stimulator Implant  Indication:  Other intervertebral disc degeneration, lumbar region  M51.36      Allergies:  Ingredient Reaction Comment   NO KNOWN ALLERGIES       Anesthesia: MAC and local.    Implants: All implants were Nevro products. The leads were model number: [id number], and serial number: [id number]. The Senza implantable pulse generator was model number: [id number] and serial number [id number]. A CaseRev TYRX envelope was used in both incisions and was lot number: [id number].    Description of Procedure:  After discussing potential risks and benefits, the patient did wish to proceed and signed a written consent form. The pocket site was marked in the preoperative area. A prophylactic prescription was given for the patient to use for the next 10 days. The patient was brought into the operating room and positioned prone on the operating table taking care relieve all pressure points and place extremities in a comfortable position. MAC anesthesia was induced. The operative field was prepped and draped in normal sterile fashion. A surgical time out was then performed stating the patients name, date of birth, allergies, and procedure to be performed. The skin was anesthetized with Xylocaine at the appropriate level as identified fluoroscopically. This site was marked.  A paramedian incision was then made in the mid back below this jh. A 14 gauge Tuohy needle was then introduced under fluoroscopy. Additional Xylocaine was injected to provide additional anesthesia. The T12/L1 epidural space was entered as a loss of resistance was felt with an air filled syringe. The stylet was then removed and an eight contact compact spinal cord stimulator lead was advanced to the mid T8 vertebral level. This lead was right of midline.  A second eight contact compact spinal cord stimulator lead was  placed through the T12/L1  epidural space and advanced to the top of the T8 vertebral level. This lead was left of midline. Lateral and AP fluoroscopy views were obtained and confirmed good placement.      I then proceeded to place the anchors . The anchors were tightened around the leads using 2.0 silk ties and then to the fascia using 2.0 silk sutures. The patient was placed under deeper anesthesia while the pocket was formed. The leads were left in place while a superficial gluteal pocket was developed  on the left side.  A subcutaneous tunnel was made and the cables were pulled through to the gluteal pocket. The cable ends were connected to the  IPG and all connections were secured to torque. An impedance check was performed and all impedances were within normal limits. The IPG and excess cable were then placed into the gluteal pocket with the excess cable coiled behind the IPG. Two 2.0 silk stitches were then used to secure the generator in place. The wounds were then irrigated profusely with antibiotic containing solution. The incisions were closed in layers using absorbable sutures. Steri-strips were used as well as sutures. Final X-ray revealed no significant changes in lead positions.      The patient was awakened, positioned supine and delivered to the recovery  room.    Lena Saunders DO   August 1, 2017

## 2020-05-18 NOTE — OR NURSING
Dr Saunders at bedside- admin 2 mg dilaudid po per verbal orders from Dr Saunders- sitting up in chair- nuero checks intact  
Patient continues to rate pain 10/10 to left leg. Constant pain with intermittent spasms in the left buttock radiating down throughout left leg. Neuro checks remain intact. No relief from Dilaudid. Dr. Johnson at bedside, ordered Neurontin and Ketamine. Medications given very small pain relief.   
Patient up to bathroom in phase 2, able to urinate, steady gait.   
Took over cares while primary Nurse goes on 15 minute evening break  
no

## (undated) DEVICE — SU VICRYL 4-0 PS-2 18" UND J496H

## (undated) DEVICE — PREP DURAPREP 26ML APL 8630

## (undated) DEVICE — DRAPE IOBAN INCISE 23X17" 6650EZ

## (undated) DEVICE — Device

## (undated) DEVICE — SYR 20ML LL W/O NDL

## (undated) DEVICE — DRAPE STERI TOWEL LG 1010

## (undated) DEVICE — SU VICRYL 0 CT-2 CR 8X18" J727D

## (undated) DEVICE — SU SILK 2-0 SH CR 8X18" C012D

## (undated) DEVICE — SUCTION CANISTER MEDIVAC LINER 3000ML W/LID 65651-530

## (undated) DEVICE — NDL 19GA 1.5"

## (undated) DEVICE — SYR 07ML EPIDURAL LOSS OF RESISTANCE PULSATOR 4905

## (undated) DEVICE — GLOVE PROTEXIS MICRO 6.0  2D73PM60

## (undated) DEVICE — DRAPE COVER C-ARM SEAMLESS SNAP-KAP 03-KP26 LATEX FREE

## (undated) DEVICE — PACK MINOR SBA15MIFSE

## (undated) DEVICE — SYR EAR BULB 3OZ 0035830

## (undated) DEVICE — GLOVE PROTEXIS BLUE W/NEU-THERA 6.0  2D73EB60

## (undated) DEVICE — GLOVE PROTEXIS POWDER FREE 7.0 ORTHOPEDIC 2D73ET70

## (undated) DEVICE — DRAPE SHEET REV FOLD 3/4 9349

## (undated) DEVICE — DRSG TELFA ISLAND 4X8" 7541

## (undated) DEVICE — PACK UNIVERSAL SPLIT 29131

## (undated) DEVICE — LINEN TOWEL PACK X5 5464

## (undated) DEVICE — DRSG KERLIX FLUFFS X5

## (undated) RX ORDER — CEFAZOLIN SODIUM 2 G/100ML
INJECTION, SOLUTION INTRAVENOUS
Status: DISPENSED
Start: 2017-08-01

## (undated) RX ORDER — HYDROMORPHONE HYDROCHLORIDE 2 MG/1
TABLET ORAL
Status: DISPENSED
Start: 2017-08-01

## (undated) RX ORDER — ACETAMINOPHEN 325 MG/1
TABLET ORAL
Status: DISPENSED
Start: 2017-08-01

## (undated) RX ORDER — KETAMINE HCL IN 0.9 % NACL 20 MG/2 ML
SYRINGE (ML) INTRAVENOUS
Status: DISPENSED
Start: 2017-08-01

## (undated) RX ORDER — HYDROMORPHONE HYDROCHLORIDE 1 MG/ML
INJECTION, SOLUTION INTRAMUSCULAR; INTRAVENOUS; SUBCUTANEOUS
Status: DISPENSED
Start: 2017-08-01

## (undated) RX ORDER — LIDOCAINE HYDROCHLORIDE AND EPINEPHRINE 10; 10 MG/ML; UG/ML
INJECTION, SOLUTION INFILTRATION; PERINEURAL
Status: DISPENSED
Start: 2017-08-01

## (undated) RX ORDER — FENTANYL CITRATE 50 UG/ML
INJECTION, SOLUTION INTRAMUSCULAR; INTRAVENOUS
Status: DISPENSED
Start: 2017-08-01

## (undated) RX ORDER — ONDANSETRON 2 MG/ML
INJECTION INTRAMUSCULAR; INTRAVENOUS
Status: DISPENSED
Start: 2017-08-01

## (undated) RX ORDER — PROPOFOL 10 MG/ML
INJECTION, EMULSION INTRAVENOUS
Status: DISPENSED
Start: 2017-08-01

## (undated) RX ORDER — LIDOCAINE HYDROCHLORIDE 20 MG/ML
INJECTION, SOLUTION EPIDURAL; INFILTRATION; INTRACAUDAL; PERINEURAL
Status: DISPENSED
Start: 2017-08-01

## (undated) RX ORDER — GABAPENTIN 300 MG/1
CAPSULE ORAL
Status: DISPENSED
Start: 2017-08-01